# Patient Record
Sex: FEMALE | Race: WHITE | NOT HISPANIC OR LATINO | URBAN - METROPOLITAN AREA
[De-identification: names, ages, dates, MRNs, and addresses within clinical notes are randomized per-mention and may not be internally consistent; named-entity substitution may affect disease eponyms.]

---

## 2018-05-30 ENCOUNTER — EMERGENCY (EMERGENCY)
Facility: HOSPITAL | Age: 48
LOS: 0 days | Discharge: HOME | End: 2018-05-30
Attending: EMERGENCY MEDICINE | Admitting: EMERGENCY MEDICINE

## 2018-05-30 VITALS
SYSTOLIC BLOOD PRESSURE: 140 MMHG | RESPIRATION RATE: 18 BRPM | HEART RATE: 88 BPM | TEMPERATURE: 96 F | WEIGHT: 149.91 LBS | OXYGEN SATURATION: 99 % | HEIGHT: 64 IN | DIASTOLIC BLOOD PRESSURE: 81 MMHG

## 2018-05-30 VITALS
DIASTOLIC BLOOD PRESSURE: 81 MMHG | HEART RATE: 88 BPM | SYSTOLIC BLOOD PRESSURE: 140 MMHG | RESPIRATION RATE: 18 BRPM | TEMPERATURE: 96 F | OXYGEN SATURATION: 99 %

## 2018-05-30 DIAGNOSIS — M79.651 PAIN IN RIGHT THIGH: ICD-10-CM

## 2018-05-30 RX ORDER — METHOCARBAMOL 500 MG/1
1 TABLET, FILM COATED ORAL
Qty: 21 | Refills: 0 | OUTPATIENT
Start: 2018-05-30 | End: 2018-06-05

## 2018-05-30 NOTE — ED PROVIDER NOTE - MEDICAL DECISION MAKING DETAILS
I personally evaluated the patient. I reviewed the Resident’s or Physician Assistant’s note (as assigned above), and agree with the findings and plan except as documented in my note. 48yo F comes in c/o a nerve pain to the lateral right thigh. Pt states that she has a h/o sciatica. Pt states that the pain is also associated with a burning and stabbing that has been intermittent over the last several day. Pt denies any fevers, no redness, no injury, trauma, no b/b incontinence, able to ambulate, no saddle anesthesia. On exam: NCAT. PERRLA, EOMI. OP clear. Lungs CTAB. RRR, S1S2 noted. Radial pulses 2+ and equal, pedal pulses 2+ and equal. Abdomen soft, NT/ND, no rebound or guarding. FROM x4 extremities. No focal neuro deficits. Plan: PO anti-inflammatory. Will obtain US. Most likely secondary to radiculopathy.

## 2018-05-30 NOTE — ED PROVIDER NOTE - OBJECTIVE STATEMENT
48 yo female  c/o intermittent right thigh pain x5 days. 48 yo female  c/o intermittent right thigh pain x5 days. No known injury. No swelling. No recent travel. No OCP use.

## 2018-05-30 NOTE — ED PROVIDER NOTE - ATTENDING CONTRIBUTION TO CARE
I personally evaluated the patient. I reviewed the Resident’s or Physician Assistant’s note (as assigned above), and agree with the findings and plan except as documented in my note. 46yo F comes in c/o a nerve pain to the lateral right thigh. Pt states that she has a h/o sciatica. Pt states that the pain is also associated with a burning and stabbing that has been intermittent over the last several day. Pt denies any fevers, no redness, no injury, trauma, no b/b incontinence, able to ambulate, no saddle anesthesia. On exam: NCAT. PERRLA, EOMI. OP clear. Lungs CTAB. RRR, S1S2 noted. Radial pulses 2+ and equal, pedal pulses 2+ and equal. Abdomen soft, NT/ND, no rebound or guarding. FROM x4 extremities. No focal neuro deficits. Plan: PO anti-inflammatory. Will obtain US. Most likely secondary to radiculopathy.

## 2018-05-30 NOTE — ED PROVIDER NOTE - PHYSICAL EXAMINATION
Physical Exam    Vital Signs: I have reviewed the initial vital signs.  Constitutional: well-nourished, appears stated age, no acute distress  Skin: No rash  Muskuloskeletal: right leg: +tender to palpation of lateral/posterior mid thigh. No swelling, erythema, or ecchymosis. ROM intact to leg. Normal gait. NT to palpation of back  Neuro: AOx3, Motor 5/5, Sensation: equal and intact

## 2018-05-30 NOTE — ED PROVIDER NOTE - NS ED ROS FT
Constitutional: (-) fever  Skin: No rash  Muskuloskeletal: right thigh pain  Neurological: (-) altered mental status

## 2023-04-03 ENCOUNTER — APPOINTMENT (OUTPATIENT)
Dept: PAIN MANAGEMENT | Facility: CLINIC | Age: 53
End: 2023-04-03
Payer: COMMERCIAL

## 2023-04-03 VITALS
SYSTOLIC BLOOD PRESSURE: 165 MMHG | BODY MASS INDEX: 31.89 KG/M2 | HEIGHT: 63 IN | WEIGHT: 180 LBS | DIASTOLIC BLOOD PRESSURE: 87 MMHG

## 2023-04-03 DIAGNOSIS — M54.16 RADICULOPATHY, LUMBAR REGION: ICD-10-CM

## 2023-04-03 PROBLEM — Z00.00 ENCOUNTER FOR PREVENTIVE HEALTH EXAMINATION: Status: ACTIVE | Noted: 2023-04-03

## 2023-04-03 PROCEDURE — 99204 OFFICE O/P NEW MOD 45 MIN: CPT

## 2023-04-03 RX ORDER — METHOCARBAMOL 750 MG/1
750 TABLET, FILM COATED ORAL
Qty: 30 | Refills: 0 | Status: ACTIVE | COMMUNITY
Start: 2023-04-03 | End: 1900-01-01

## 2023-04-03 RX ORDER — TRAMADOL HYDROCHLORIDE 50 MG/1
50 TABLET, COATED ORAL TWICE DAILY
Qty: 28 | Refills: 0 | Status: ACTIVE | COMMUNITY
Start: 2023-04-03 | End: 1900-01-01

## 2023-04-03 RX ORDER — METHYLPREDNISOLONE 4 MG/1
4 TABLET ORAL
Qty: 1 | Refills: 0 | Status: ACTIVE | COMMUNITY
Start: 2023-04-03 | End: 1900-01-01

## 2023-04-03 NOTE — ASSESSMENT
[FreeTextEntry1] : 52 year old female presenting with a severe flare up of lumbar radicular pain. Her pain significantly affects her daily life function at home as well as at work. She has trialed and failed a 7 day physician directed course of home exercises and medications. My plan to is obtain a MRI of the lumbar spine and EMG of the lower extremities to plan for a epidural injection. For symptom control, I will prescribe a Medrol dos [place] as well as Tramadol 50 mg b.i.d., 14 tablets for 1 week  along with methocarbamol 750 mg. Follow up in 6 weeks will be made for reassessment. \par \par MRI of the lumbar spine was ordered to delineate spine pathology such as disc displacement and stenosis.\par \par An Lower EMG/NCV was ordered to delineate radiculopathy vs neuropathies vs nerve damage. \par \par Entered by Sun Caban, acting as scribe for Dr. Dover.\par  \par The documentation recorded by the scribe, in my presence, accurately reflects the service I personally performed, and the decisions made by me with my edits as appropriate.\par  \par Best Regards, \par Aram Dover MD \par Board Certified, Anesthesiology \par Board Certified, Pain Medicine\par

## 2023-04-03 NOTE — PHYSICAL EXAM
[de-identified] : BACK - tenderness into the lumbar paraspinals. ROM restricted. Pain with flexion. Positive SLR on the right.

## 2023-04-03 NOTE — HISTORY OF PRESENT ILLNESS
[FreeTextEntry1] : Ms. Andrews is a 52 year old female presenting to our walk in clinic to establish care for her Severe lower back pain.  She gives a history of having a prior episode of lower back pain in the past.  She states the pain travels into the right lower extremity.  She states this current episode began about 1 week ago without any inciting event.  She currently rates the pain as a 9/10 on the pain scale.  She states the pain is constant in nature in daily.  She states the pain travels in the back and into the right buttock extending into the right hamstring occasionally.  She states the pain is also associated with stiffness and spasms into her right calf.  She states she has severe difficulty working secondary to the pain.  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  She has been doing home exercises with minimal to no relief.  She denies any changes in bladder/bowel habits or any other red flags.

## 2023-04-13 ENCOUNTER — APPOINTMENT (OUTPATIENT)
Dept: MRI IMAGING | Facility: CLINIC | Age: 53
End: 2023-04-13

## 2023-04-21 ENCOUNTER — APPOINTMENT (OUTPATIENT)
Dept: PAIN MANAGEMENT | Facility: CLINIC | Age: 53
End: 2023-04-21

## 2023-04-26 ENCOUNTER — APPOINTMENT (OUTPATIENT)
Dept: PAIN MANAGEMENT | Facility: CLINIC | Age: 53
End: 2023-04-26

## 2023-10-19 ENCOUNTER — NON-APPOINTMENT (OUTPATIENT)
Age: 53
End: 2023-10-19